# Patient Record
Sex: FEMALE
[De-identification: names, ages, dates, MRNs, and addresses within clinical notes are randomized per-mention and may not be internally consistent; named-entity substitution may affect disease eponyms.]

---

## 2019-06-21 PROBLEM — Z00.00 ENCOUNTER FOR PREVENTIVE HEALTH EXAMINATION: Status: ACTIVE | Noted: 2019-06-21

## 2019-07-01 ENCOUNTER — APPOINTMENT (OUTPATIENT)
Dept: VASCULAR SURGERY | Facility: CLINIC | Age: 49
End: 2019-07-01

## 2020-02-09 ENCOUNTER — TRANSCRIPTION ENCOUNTER (OUTPATIENT)
Age: 50
End: 2020-02-09

## 2021-10-07 ENCOUNTER — OUTPATIENT (OUTPATIENT)
Dept: OUTPATIENT SERVICES | Facility: HOSPITAL | Age: 51
LOS: 1 days | End: 2021-10-07
Payer: COMMERCIAL

## 2021-10-07 ENCOUNTER — APPOINTMENT (OUTPATIENT)
Dept: MAMMOGRAPHY | Facility: CLINIC | Age: 51
End: 2021-10-07
Payer: COMMERCIAL

## 2021-10-07 ENCOUNTER — APPOINTMENT (OUTPATIENT)
Dept: ULTRASOUND IMAGING | Facility: CLINIC | Age: 51
End: 2021-10-07
Payer: COMMERCIAL

## 2021-10-07 DIAGNOSIS — Z00.8 ENCOUNTER FOR OTHER GENERAL EXAMINATION: ICD-10-CM

## 2021-10-07 PROCEDURE — 77063 BREAST TOMOSYNTHESIS BI: CPT

## 2021-10-07 PROCEDURE — 77067 SCR MAMMO BI INCL CAD: CPT | Mod: 26

## 2021-10-07 PROCEDURE — 77063 BREAST TOMOSYNTHESIS BI: CPT | Mod: 26

## 2021-10-07 PROCEDURE — 77067 SCR MAMMO BI INCL CAD: CPT

## 2021-12-20 ENCOUNTER — ASOB RESULT (OUTPATIENT)
Age: 51
End: 2021-12-20

## 2021-12-20 ENCOUNTER — APPOINTMENT (OUTPATIENT)
Dept: ANTEPARTUM | Facility: CLINIC | Age: 51
End: 2021-12-20
Payer: COMMERCIAL

## 2021-12-20 PROCEDURE — 76813 OB US NUCHAL MEAS 1 GEST: CPT | Mod: 59

## 2021-12-20 PROCEDURE — 76801 OB US < 14 WKS SINGLE FETUS: CPT

## 2022-02-14 ENCOUNTER — ASOB RESULT (OUTPATIENT)
Age: 52
End: 2022-02-14

## 2022-02-14 ENCOUNTER — APPOINTMENT (OUTPATIENT)
Dept: ANTEPARTUM | Facility: CLINIC | Age: 52
End: 2022-02-14
Payer: COMMERCIAL

## 2022-02-14 PROCEDURE — 76811 OB US DETAILED SNGL FETUS: CPT

## 2022-02-14 PROCEDURE — 76827 ECHO EXAM OF FETAL HEART: CPT

## 2022-02-14 PROCEDURE — 76825 ECHO EXAM OF FETAL HEART: CPT

## 2022-02-14 PROCEDURE — 93325 DOPPLER ECHO COLOR FLOW MAPG: CPT

## 2022-02-14 PROCEDURE — 76817 TRANSVAGINAL US OBSTETRIC: CPT

## 2022-02-15 ENCOUNTER — OUTPATIENT (OUTPATIENT)
Dept: OUTPATIENT SERVICES | Age: 52
LOS: 1 days | Discharge: HOME | End: 2022-02-15

## 2022-02-16 ENCOUNTER — APPOINTMENT (OUTPATIENT)
Dept: PEDIATRIC CARDIOLOGY | Facility: CLINIC | Age: 52
End: 2022-02-16

## 2022-02-18 ENCOUNTER — APPOINTMENT (OUTPATIENT)
Dept: ANTEPARTUM | Facility: CLINIC | Age: 52
End: 2022-02-18
Payer: COMMERCIAL

## 2022-02-18 ENCOUNTER — ASOB RESULT (OUTPATIENT)
Age: 52
End: 2022-02-18

## 2022-02-18 PROCEDURE — 76815 OB US LIMITED FETUS(S): CPT

## 2022-02-18 PROCEDURE — 76817 TRANSVAGINAL US OBSTETRIC: CPT

## 2022-03-03 ENCOUNTER — APPOINTMENT (OUTPATIENT)
Dept: ANTEPARTUM | Facility: CLINIC | Age: 52
End: 2022-03-03

## 2022-03-09 ENCOUNTER — APPOINTMENT (OUTPATIENT)
Dept: ANTEPARTUM | Facility: CLINIC | Age: 52
End: 2022-03-09
Payer: COMMERCIAL

## 2022-03-09 ENCOUNTER — ASOB RESULT (OUTPATIENT)
Age: 52
End: 2022-03-09

## 2022-03-09 PROCEDURE — 76816 OB US FOLLOW-UP PER FETUS: CPT

## 2022-03-09 PROCEDURE — 76817 TRANSVAGINAL US OBSTETRIC: CPT

## 2022-03-14 ENCOUNTER — APPOINTMENT (OUTPATIENT)
Dept: ANTEPARTUM | Facility: CLINIC | Age: 52
End: 2022-03-14

## 2022-04-11 ENCOUNTER — APPOINTMENT (OUTPATIENT)
Dept: ANTEPARTUM | Facility: CLINIC | Age: 52
End: 2022-04-11
Payer: COMMERCIAL

## 2022-04-11 ENCOUNTER — ASOB RESULT (OUTPATIENT)
Age: 52
End: 2022-04-11

## 2022-04-11 PROCEDURE — 76816 OB US FOLLOW-UP PER FETUS: CPT

## 2022-05-09 ENCOUNTER — APPOINTMENT (OUTPATIENT)
Dept: ANTEPARTUM | Facility: CLINIC | Age: 52
End: 2022-05-09
Payer: COMMERCIAL

## 2022-05-09 ENCOUNTER — ASOB RESULT (OUTPATIENT)
Age: 52
End: 2022-05-09

## 2022-05-09 PROCEDURE — 76819 FETAL BIOPHYS PROFIL W/O NST: CPT

## 2022-05-09 PROCEDURE — 76816 OB US FOLLOW-UP PER FETUS: CPT

## 2022-05-21 ENCOUNTER — NON-APPOINTMENT (OUTPATIENT)
Age: 52
End: 2022-05-21

## 2022-05-23 ENCOUNTER — TRANSCRIPTION ENCOUNTER (OUTPATIENT)
Age: 52
End: 2022-05-23

## 2022-06-03 ENCOUNTER — APPOINTMENT (OUTPATIENT)
Dept: ANTEPARTUM | Facility: CLINIC | Age: 52
End: 2022-06-03
Payer: COMMERCIAL

## 2022-06-03 ENCOUNTER — ASOB RESULT (OUTPATIENT)
Age: 52
End: 2022-06-03

## 2022-06-03 PROCEDURE — 76816 OB US FOLLOW-UP PER FETUS: CPT

## 2022-06-03 PROCEDURE — 76819 FETAL BIOPHYS PROFIL W/O NST: CPT

## 2022-06-10 ENCOUNTER — APPOINTMENT (OUTPATIENT)
Dept: ANTEPARTUM | Facility: CLINIC | Age: 52
End: 2022-06-10
Payer: COMMERCIAL

## 2022-06-10 ENCOUNTER — ASOB RESULT (OUTPATIENT)
Age: 52
End: 2022-06-10

## 2022-06-10 PROCEDURE — 76819 FETAL BIOPHYS PROFIL W/O NST: CPT

## 2022-06-17 ENCOUNTER — ASOB RESULT (OUTPATIENT)
Age: 52
End: 2022-06-17

## 2022-06-17 ENCOUNTER — APPOINTMENT (OUTPATIENT)
Dept: ANTEPARTUM | Facility: CLINIC | Age: 52
End: 2022-06-17
Payer: COMMERCIAL

## 2022-06-17 PROCEDURE — 76819 FETAL BIOPHYS PROFIL W/O NST: CPT

## 2022-06-24 ENCOUNTER — ASOB RESULT (OUTPATIENT)
Age: 52
End: 2022-06-24

## 2022-06-24 ENCOUNTER — APPOINTMENT (OUTPATIENT)
Dept: ANTEPARTUM | Facility: CLINIC | Age: 52
End: 2022-06-24
Payer: COMMERCIAL

## 2022-06-24 PROCEDURE — 76819 FETAL BIOPHYS PROFIL W/O NST: CPT

## 2022-07-01 ENCOUNTER — APPOINTMENT (OUTPATIENT)
Dept: ANTEPARTUM | Facility: CLINIC | Age: 52
End: 2022-07-01

## 2022-07-01 ENCOUNTER — ASOB RESULT (OUTPATIENT)
Age: 52
End: 2022-07-01

## 2022-07-01 PROCEDURE — 76816 OB US FOLLOW-UP PER FETUS: CPT

## 2022-07-01 PROCEDURE — 76819 FETAL BIOPHYS PROFIL W/O NST: CPT

## 2022-07-03 ENCOUNTER — TRANSCRIPTION ENCOUNTER (OUTPATIENT)
Age: 52
End: 2022-07-03

## 2022-07-03 ENCOUNTER — INPATIENT (INPATIENT)
Facility: HOSPITAL | Age: 52
LOS: 0 days | Discharge: HOME | End: 2022-07-04
Attending: STUDENT IN AN ORGANIZED HEALTH CARE EDUCATION/TRAINING PROGRAM | Admitting: OBSTETRICS & GYNECOLOGY
Payer: COMMERCIAL

## 2022-07-03 VITALS
RESPIRATION RATE: 18 BRPM | SYSTOLIC BLOOD PRESSURE: 132 MMHG | HEART RATE: 80 BPM | TEMPERATURE: 98 F | DIASTOLIC BLOOD PRESSURE: 77 MMHG

## 2022-07-03 DIAGNOSIS — O26.899 OTHER SPECIFIED PREGNANCY RELATED CONDITIONS, UNSPECIFIED TRIMESTER: ICD-10-CM

## 2022-07-03 DIAGNOSIS — Z34.80 ENCOUNTER FOR SUPERVISION OF OTHER NORMAL PREGNANCY, UNSPECIFIED TRIMESTER: ICD-10-CM

## 2022-07-03 DIAGNOSIS — Z90.89 ACQUIRED ABSENCE OF OTHER ORGANS: Chronic | ICD-10-CM

## 2022-07-03 DIAGNOSIS — Z3A.00 WEEKS OF GESTATION OF PREGNANCY NOT SPECIFIED: ICD-10-CM

## 2022-07-03 LAB
BLD GP AB SCN SERPL QL: NEGATIVE — SIGNIFICANT CHANGE UP
COVID-19 SPIKE DOMAIN AB INTERP: NEGATIVE — SIGNIFICANT CHANGE UP
COVID-19 SPIKE DOMAIN ANTIBODY RESULT: 0.4 U/ML — SIGNIFICANT CHANGE UP
HCT VFR BLD CALC: 40.1 % — SIGNIFICANT CHANGE UP (ref 34.5–45)
HGB BLD-MCNC: 12.3 G/DL — SIGNIFICANT CHANGE UP (ref 11.5–15.5)
MCHC RBC-ENTMCNC: 24.4 PG — LOW (ref 27–34)
MCHC RBC-ENTMCNC: 30.7 GM/DL — LOW (ref 32–36)
MCV RBC AUTO: 79.6 FL — LOW (ref 80–100)
NRBC # BLD: 0 /100 WBCS — SIGNIFICANT CHANGE UP (ref 0–0)
PLATELET # BLD AUTO: 253 K/UL — SIGNIFICANT CHANGE UP (ref 150–400)
RBC # BLD: 5.04 M/UL — SIGNIFICANT CHANGE UP (ref 3.8–5.2)
RBC # FLD: 27.9 % — HIGH (ref 10.3–14.5)
RH IG SCN BLD-IMP: POSITIVE — SIGNIFICANT CHANGE UP
RH IG SCN BLD-IMP: POSITIVE — SIGNIFICANT CHANGE UP
SARS-COV-2 IGG+IGM SERPL QL IA: 0.4 U/ML — SIGNIFICANT CHANGE UP
SARS-COV-2 IGG+IGM SERPL QL IA: NEGATIVE — SIGNIFICANT CHANGE UP
SARS-COV-2 RNA SPEC QL NAA+PROBE: SIGNIFICANT CHANGE UP
WBC # BLD: 13.97 K/UL — HIGH (ref 3.8–10.5)
WBC # FLD AUTO: 13.97 K/UL — HIGH (ref 3.8–10.5)

## 2022-07-03 RX ORDER — HYDROCORTISONE 1 %
1 OINTMENT (GRAM) TOPICAL EVERY 6 HOURS
Refills: 0 | Status: DISCONTINUED | OUTPATIENT
Start: 2022-07-03 | End: 2022-07-04

## 2022-07-03 RX ORDER — DIPHENHYDRAMINE HCL 50 MG
25 CAPSULE ORAL EVERY 6 HOURS
Refills: 0 | Status: DISCONTINUED | OUTPATIENT
Start: 2022-07-03 | End: 2022-07-04

## 2022-07-03 RX ORDER — IBUPROFEN 200 MG
600 TABLET ORAL EVERY 6 HOURS
Refills: 0 | Status: COMPLETED | OUTPATIENT
Start: 2022-07-03 | End: 2023-06-01

## 2022-07-03 RX ORDER — OXYCODONE HYDROCHLORIDE 5 MG/1
5 TABLET ORAL ONCE
Refills: 0 | Status: DISCONTINUED | OUTPATIENT
Start: 2022-07-03 | End: 2022-07-04

## 2022-07-03 RX ORDER — SIMETHICONE 80 MG/1
80 TABLET, CHEWABLE ORAL EVERY 4 HOURS
Refills: 0 | Status: DISCONTINUED | OUTPATIENT
Start: 2022-07-03 | End: 2022-07-04

## 2022-07-03 RX ORDER — DIBUCAINE 1 %
1 OINTMENT (GRAM) RECTAL EVERY 6 HOURS
Refills: 0 | Status: DISCONTINUED | OUTPATIENT
Start: 2022-07-03 | End: 2022-07-04

## 2022-07-03 RX ORDER — OXYCODONE HYDROCHLORIDE 5 MG/1
5 TABLET ORAL
Refills: 0 | Status: DISCONTINUED | OUTPATIENT
Start: 2022-07-03 | End: 2022-07-04

## 2022-07-03 RX ORDER — KETOROLAC TROMETHAMINE 30 MG/ML
30 SYRINGE (ML) INJECTION ONCE
Refills: 0 | Status: DISCONTINUED | OUTPATIENT
Start: 2022-07-03 | End: 2022-07-03

## 2022-07-03 RX ORDER — ACETAMINOPHEN 500 MG
975 TABLET ORAL
Refills: 0 | Status: DISCONTINUED | OUTPATIENT
Start: 2022-07-03 | End: 2022-07-04

## 2022-07-03 RX ORDER — IBUPROFEN 200 MG
600 TABLET ORAL EVERY 6 HOURS
Refills: 0 | Status: DISCONTINUED | OUTPATIENT
Start: 2022-07-03 | End: 2022-07-04

## 2022-07-03 RX ORDER — MAGNESIUM HYDROXIDE 400 MG/1
30 TABLET, CHEWABLE ORAL
Refills: 0 | Status: DISCONTINUED | OUTPATIENT
Start: 2022-07-03 | End: 2022-07-04

## 2022-07-03 RX ORDER — ACETAMINOPHEN 500 MG
3 TABLET ORAL
Qty: 0 | Refills: 0 | DISCHARGE
Start: 2022-07-03

## 2022-07-03 RX ORDER — TETANUS TOXOID, REDUCED DIPHTHERIA TOXOID AND ACELLULAR PERTUSSIS VACCINE, ADSORBED 5; 2.5; 8; 8; 2.5 [IU]/.5ML; [IU]/.5ML; UG/.5ML; UG/.5ML; UG/.5ML
0.5 SUSPENSION INTRAMUSCULAR ONCE
Refills: 0 | Status: DISCONTINUED | OUTPATIENT
Start: 2022-07-03 | End: 2022-07-04

## 2022-07-03 RX ORDER — BENZOCAINE 10 %
1 GEL (GRAM) MUCOUS MEMBRANE EVERY 6 HOURS
Refills: 0 | Status: DISCONTINUED | OUTPATIENT
Start: 2022-07-03 | End: 2022-07-04

## 2022-07-03 RX ORDER — PRAMOXINE HYDROCHLORIDE 150 MG/15G
1 AEROSOL, FOAM RECTAL EVERY 4 HOURS
Refills: 0 | Status: DISCONTINUED | OUTPATIENT
Start: 2022-07-03 | End: 2022-07-04

## 2022-07-03 RX ORDER — AER TRAVELER 0.5 G/1
1 SOLUTION RECTAL; TOPICAL EVERY 4 HOURS
Refills: 0 | Status: DISCONTINUED | OUTPATIENT
Start: 2022-07-03 | End: 2022-07-04

## 2022-07-03 RX ORDER — OXYTOCIN 10 UNIT/ML
333.33 VIAL (ML) INJECTION
Qty: 20 | Refills: 0 | Status: DISCONTINUED | OUTPATIENT
Start: 2022-07-03 | End: 2022-07-04

## 2022-07-03 RX ORDER — SODIUM CHLORIDE 9 MG/ML
3 INJECTION INTRAMUSCULAR; INTRAVENOUS; SUBCUTANEOUS EVERY 8 HOURS
Refills: 0 | Status: DISCONTINUED | OUTPATIENT
Start: 2022-07-03 | End: 2022-07-04

## 2022-07-03 RX ORDER — IBUPROFEN 200 MG
1 TABLET ORAL
Qty: 0 | Refills: 0 | DISCHARGE
Start: 2022-07-03

## 2022-07-03 RX ORDER — LANOLIN
1 OINTMENT (GRAM) TOPICAL EVERY 6 HOURS
Refills: 0 | Status: DISCONTINUED | OUTPATIENT
Start: 2022-07-03 | End: 2022-07-04

## 2022-07-03 RX ADMIN — Medication 975 MILLIGRAM(S): at 20:48

## 2022-07-03 RX ADMIN — Medication 975 MILLIGRAM(S): at 08:50

## 2022-07-03 RX ADMIN — Medication 600 MILLIGRAM(S): at 17:51

## 2022-07-03 RX ADMIN — Medication 975 MILLIGRAM(S): at 15:39

## 2022-07-03 RX ADMIN — Medication 600 MILLIGRAM(S): at 12:36

## 2022-07-03 RX ADMIN — Medication 975 MILLIGRAM(S): at 15:14

## 2022-07-03 RX ADMIN — Medication 600 MILLIGRAM(S): at 23:57

## 2022-07-03 RX ADMIN — Medication 975 MILLIGRAM(S): at 21:18

## 2022-07-03 RX ADMIN — Medication 600 MILLIGRAM(S): at 17:28

## 2022-07-03 RX ADMIN — Medication 30 MILLIGRAM(S): at 03:45

## 2022-07-03 RX ADMIN — Medication 600 MILLIGRAM(S): at 12:06

## 2022-07-03 RX ADMIN — Medication 975 MILLIGRAM(S): at 08:22

## 2022-07-03 NOTE — OB PROVIDER H&P - ATTENDING COMMENTS
patient admitted in active labor and PROM shortly before arriving to hospital    STEPHANIE Schmid MD

## 2022-07-03 NOTE — OB RN PATIENT PROFILE - FALL HARM RISK - UNIVERSAL INTERVENTIONS
Bed in lowest position, wheels locked, appropriate side rails in place/Call bell, personal items and telephone in reach/Instruct patient to call for assistance before getting out of bed or chair/Non-slip footwear when patient is out of bed/[place] to call system/Physically safe environment - no spills, clutter or unnecessary equipment/Purposeful Proactive Rounding/Room/bathroom lighting operational, light cord in reach

## 2022-07-03 NOTE — OB PROVIDER DELIVERY SUMMARY - NSATTENDATTESTATION_OBGYN_A_OB
Through wd window  Avoid sedative medications   I was physically present and participated in this delivery.

## 2022-07-03 NOTE — DISCHARGE NOTE OB - MEDICATION SUMMARY - MEDICATIONS TO TAKE
I will START or STAY ON the medications listed below when I get home from the hospital:    acetaminophen 325 mg oral tablet  -- 3 tab(s) by mouth every 6 hours  -- Indication: For pain    ibuprofen 600 mg oral tablet  -- 1 tab(s) by mouth every 6 hours  -- Indication: For pain    Prenatal Multivitamins with Folic Acid 1 mg oral tablet  -- 1 tab(s) by mouth once a day  -- Indication: For vitamin

## 2022-07-03 NOTE — DISCHARGE NOTE OB - PLAN OF CARE
After discharge, please stay on pelvic rest for 6 weeks, meaning no sexual intercourse, no tampons and no douching.  No driving for 2 weeks as women can lose a lot of blood during delivery and there is a possibility of being lightheaded/fainting.  No lifting objects heavier than baby for two weeks.  Expect to have vaginal bleeding/spotting for up to six weeks.  The bleeding should get lighter and more white/light brown with time.  For bleeding soaking more than a pad an hour or passing clots greater than the size of your fist, come in to the emergency department.    Follow up in the office in 5 weeks for a postpartum visit.

## 2022-07-03 NOTE — OB PROVIDER H&P - HISTORY OF PRESENT ILLNESS
HPI: Pt is a 51y   @40wks+2d presenting in labor and precipitously delivered.     IVF donor transfer    PNC uncomplicated.    OBHx: 2020  uncomplicated  GynHx: + hx of fibroids denies hx of cysts, abnormal Pap smears or STDs  PMHx: denies  PSHx: denies  Med: PNV, ASA  All: NKDA  Psych: denies hx of of depression or anxiety  SH: denies hx of smoking, drinking, or drug usage during the pregnancy

## 2022-07-03 NOTE — OB PROVIDER DELIVERY SUMMARY - NSPROVIDERDELIVERYNOTE_OBGYN_ALL_OB_FT
female infant delivered over intact perineum. no nuchal cord. Shoulders and body delivered spontaneously. Delayed cord clamping and private cord blood collection performed. placenta delivered spontaneously, intact. second degree and L labial lacerations repaired with 2-0 and 3-0 vicryl. vagina and sulci inspected and a small R sulcal tear was noted and reapproximated. no other lacerations noted. fundus firm and bleeding minimal after delivery

## 2022-07-03 NOTE — DISCHARGE NOTE OB - CARE PLAN
Principal Discharge DX:	Vaginal delivery  Assessment and plan of treatment:	After discharge, please stay on pelvic rest for 6 weeks, meaning no sexual intercourse, no tampons and no douching.  No driving for 2 weeks as women can lose a lot of blood during delivery and there is a possibility of being lightheaded/fainting.  No lifting objects heavier than baby for two weeks.  Expect to have vaginal bleeding/spotting for up to six weeks.  The bleeding should get lighter and more white/light brown with time.  For bleeding soaking more than a pad an hour or passing clots greater than the size of your fist, come in to the emergency department.    Follow up in the office in 5 weeks for a postpartum visit.   1

## 2022-07-03 NOTE — DISCHARGE NOTE OB - CARE PROVIDER_API CALL
Jerri Schmid)  OBSGYN  General  [address] [place] [address], [address] [place] [address]  Phone: [phone]  Fax: [phone]  Follow Up Time:

## 2022-07-03 NOTE — OB PROVIDER H&P - NSHPPHYSICALEXAM_GEN_ALL_CORE
Vital Signs Last 24 Hrs  T(C): 36.5 (03 Jul 2022 01:35), Max: 36.5 (03 Jul 2022 01:35)  T(F): 97.7 (03 Jul 2022 01:35), Max: 97.7 (03 Jul 2022 01:35)  HR: 93 (03 Jul 2022 04:24) (71 - 100)  BP: 126/71 (03 Jul 2022 04:24) (107/67 - 139/84)  BP(mean): --  RR: 18 (03 Jul 2022 01:35) (18 - 18)  SpO2: 99% (03 Jul 2022 04:24) (98% - 100%)    Physical Exam:  Gen: NAD, AOx3  CV: RRR  Resp: CTAB  Abd: soft, NT, gravid

## 2022-07-03 NOTE — DISCHARGE NOTE OB - [facility] PROVIDES COLOSTRUM THAT IS HIGH IN PROTEIN
Order placed and printed and placed at  at [facility]. Patient made aware.   
Pt is wondering if she can get a written prescription for her mammogram, pt gets these done in [place]. [place] now and is wanting to pick It up at [facility].   
Statement Selected

## 2022-07-03 NOTE — OB PROVIDER H&P - ASSESSMENT
A/P:  Pt is a 51y   @40+2 presenting in labor and precipitously delivered.     - Admit to LND.   -Routine Labs.   -IVF  -covid swab  -postpartum orders    [place] PGY3  d/w Dr. Schmid

## 2022-07-03 NOTE — DISCHARGE NOTE OB - HOSPITAL COURSE
Patient had uncomplicated, nonsurgical vaginal delivery.  Please see delivery note for details.  During postpartum course patient's vitals were stable, vaginal bleeding appropriate, and pain well controlled.  On day of discharge patient was ambulating, her pain controlled with oral medications, had adequate oral intake, and was voiding freely.  Discharge instructions and precautions were given.  Will return to clinic in 5 weeks for postpartum visit.

## 2022-07-03 NOTE — OB RN PATIENT PROFILE - INSTRUCTED TO PATIENT: IF THE INFANT IS NOT PINK DURING SKIN TO SKIN, THE PARENTS IS TO SEEK ASSISTANCE IMMEDIATELY.
Pt to ED from  for evaluation of CP, SOB, N/V x3 days. Pt had abnormal EKG at  and was directed to present to ED for further evaluation. Pt denies any other complaints. A&OX4.    Statement Selected

## 2022-07-03 NOTE — OB RN PATIENT PROFILE - FUNCTIONAL ASSESSMENT - BASIC MOBILITY 6.
4-calculated by average/Not able to assess (calculate score using [facility] averaging method)

## 2022-07-03 NOTE — DISCHARGE NOTE OB - PATIENT PORTAL LINK FT
You can access the FollowMyHealth Patient Portal offered by [facility] by registering at the following website: [email or web address]. By joining "[employer]’s FollowMyHealth portal, you will also be able to view your health information using other applications (apps) compatible with our system.

## 2022-07-03 NOTE — DISCHARGE NOTE OB - NS MD DC FALL RISK RISK
For information on Fall & Injury Prevention, visit: [email or web address] OR  [email or web address] OR  https://www.cdc.gov/steadi/patient.html

## 2022-07-04 VITALS
OXYGEN SATURATION: 98 % | TEMPERATURE: 98 F | SYSTOLIC BLOOD PRESSURE: 117 MMHG | RESPIRATION RATE: 18 BRPM | DIASTOLIC BLOOD PRESSURE: 73 MMHG | HEART RATE: 61 BPM

## 2022-07-04 LAB
HCT VFR BLD CALC: 24.8 % — LOW (ref 34.5–45)
HGB BLD-MCNC: 7.6 G/DL — LOW (ref 11.5–15.5)
T PALLIDUM AB TITR SER: NEGATIVE — SIGNIFICANT CHANGE UP

## 2022-07-04 PROCEDURE — 86900 BLOOD TYPING SEROLOGIC ABO: CPT

## 2022-07-04 PROCEDURE — 36415 COLL VENOUS BLD VENIPUNCTURE: CPT

## 2022-07-04 PROCEDURE — G0463: CPT

## 2022-07-04 PROCEDURE — 86780 TREPONEMA PALLIDUM: CPT

## 2022-07-04 PROCEDURE — 59050 FETAL MONITOR W/REPORT: CPT

## 2022-07-04 PROCEDURE — 85014 HEMATOCRIT: CPT

## 2022-07-04 PROCEDURE — 86850 RBC ANTIBODY SCREEN: CPT

## 2022-07-04 PROCEDURE — 87635 SARS-COV-2 COVID-19 AMP PRB: CPT

## 2022-07-04 PROCEDURE — 85018 HEMOGLOBIN: CPT

## 2022-07-04 PROCEDURE — 86769 SARS-COV-2 COVID-19 ANTIBODY: CPT

## 2022-07-04 PROCEDURE — 86901 BLOOD TYPING SEROLOGIC RH(D): CPT

## 2022-07-04 PROCEDURE — 85027 COMPLETE CBC AUTOMATED: CPT

## 2022-07-04 RX ADMIN — Medication 600 MILLIGRAM(S): at 00:27

## 2022-07-04 RX ADMIN — Medication 975 MILLIGRAM(S): at 03:16

## 2022-07-04 RX ADMIN — Medication 975 MILLIGRAM(S): at 09:32

## 2022-07-04 RX ADMIN — Medication 975 MILLIGRAM(S): at 02:46

## 2022-07-04 RX ADMIN — Medication 600 MILLIGRAM(S): at 12:30

## 2022-07-04 RX ADMIN — Medication 975 MILLIGRAM(S): at 10:00

## 2022-07-04 RX ADMIN — Medication 600 MILLIGRAM(S): at 11:54

## 2022-07-04 RX ADMIN — Medication 600 MILLIGRAM(S): at 05:28

## 2022-07-04 NOTE — PROGRESS NOTE ADULT - ATTENDING COMMENTS
I personally saw and evaluated pt.  She is doing well on PPD 1 after vaginal delivery.  Pt denies any dizziness or lightheadedness and is able to ambulate without difficulty.  Her pain is adequately controlled and she remained afebrile  On exam, fundus is firm below umbilicus and bleeding is moderate.  H/H this am showed PP anemia.  Advised pt to continue her daily S[place] Fe as well as incorporate Fe rich foods including leafy green vegetables.  Discharge planning today    KIRBY Michael

## 2022-07-04 NOTE — PROGRESS NOTE ADULT - SUBJECTIVE AND OBJECTIVE BOX
OB Progress Note:  PPD#2    S: 52yo PPD#2 s/p . Patient feels well. Complains of some constipation. Denies any use of a stool softner. Has not passed flatus. Pain is well controlled, tolerating regular diet, voiding spontaneously, ambulating without difficulty. Denies heavy vaginal bleeding, CP/SOB, leadheadedness/dizziness, N/V.    O:  Vitals:   Vital Signs Last 24 Hrs  T(C): 36.6 (2022 05:24), Max: 37 (2022 08:00)  T(F): 97.8 (2022 05:24), Max: 98.6 (2022 08:00)  HR: 61 (2022 05:24) (60 - 76)  BP: 117/73 (2022 05:24) (107/64 - 130/80)  BP(mean): --  RR: 18 (2022 05:24) (18 - 18)  SpO2: 98% (2022 05:24) (97% - 98%)    MEDICATIONS  (STANDING):  acetaminophen     Tablet .. 975 milliGRAM(s) Oral <User Schedule>  diphtheria/tetanus/pertussis (acellular) Vaccine (ADAcel) 0.5 milliLiter(s) IntraMuscular once  ibuprofen  Tablet. 600 milliGRAM(s) Oral every 6 hours  oxytocin Infusion 333.333 milliUNIT(s)/Min (1000 mL/Hr) IV Continuous <Continuous>  prenatal multivitamin 1 Tablet(s) Oral daily  sodium chloride 0.9% lock flush 3 milliLiter(s) IV Push every 8 hours    MEDICATIONS  (PRN):  benzocaine 20%/menthol 0.5% Spray 1 Spray(s) Topical every 6 hours PRN for Perineal discomfort  dibucaine 1% Ointment 1 Application(s) Topical every 6 hours PRN Perineal discomfort  diphenhydrAMINE 25 milliGRAM(s) Oral every 6 hours PRN Pruritus  hydrocortisone 1% Cream 1 Application(s) Topical every 6 hours PRN Moderate Pain (4-6)  lanolin Ointment 1 Application(s) Topical every 6 hours PRN nipple soreness  magnesium hydroxide Suspension 30 milliLiter(s) Oral two times a day PRN Constipation  oxyCODONE    IR 5 milliGRAM(s) Oral every 3 hours PRN Moderate to Severe Pain (4-10)  oxyCODONE    IR 5 milliGRAM(s) Oral once PRN Moderate to Severe Pain (4-10)  pramoxine 1%/zinc 5% Cream 1 Application(s) Topical every 4 hours PRN Moderate Pain (4-6)  simethicone 80 milliGRAM(s) Chew every 4 hours PRN Gas  witch hazel Pads 1 Application(s) Topical every 4 hours PRN Perineal discomfort      Labs:  Blood type: A Positive  Rubella IgG: RPR: Negative                          7.6<L>   -- >-----------< --    (  @ 06:12 )             24.8<L>                        12.3   13.97<H> >-----------< 253    (  @ 05:03 )             40.1        Physical Exam:  General: NAD  Abdomen: Firm, R-deviated uterine fundis. Soft, non-tender, non-distended  Vaginal: No heavy vaginal bleeding  Extremities: No erythema/edema    A/P: 52yo PPD#2 s/p .  Patient is stable and doing well post-partum.      - Pain well controlled, continue current pain regimen  - Increase ambulation, SCDs when not ambulating  - Continue regular diet   - Start stool softener  - Discharge planning: home today     Claudette Jovel, PGY-1
[FreeTextEntry1] : Ms. Tim is a 41 year-old woman with a history of celiac disease and Hashimoto's hypothyroidism presenting for f/u of hypothyroidism. She's is scheduled for colonoscopy on Friday for bowel leakage and anal pruritus. She reports difficulty following asleep and difficulty waking up in the morning and feeling very sluggish for several hours upon waking. She has tried melatonin, which helps her fall asleep but not stay asleep. She reports some weight gain. She reports some decreased exercise tolerance over past year. She has been furloughed from her job, so routine is altered. She was in [place] for a couple months where she had her last labs drawn. She has been back in  for a couple of months, splitting time between [facility] and [place].\par \par Hypothyroidism.\par She was diagnosed in 2000.\par She is taking [place] thyroid 67.5 mg per day and has been for past year.\par She takes it in the morning, on an empty stomach, with plain water, and waits 1 hour before eating.\par Labs -- May 2019: TSH 0.70, FT4 0.7, TT4 5.1, TT3 29, FT3 3.3. Reported labs from May 2020: TSH 0.5mI u/l (0.27-4.2), and thyroxine 9.1 pmol (12.0-22).\par Thyroid US - January 2017. No nodules and unremarkable. See scanned.\par Previously she was taking WP Thyroid for about 3 years, at the 81.25 mg dose since about September 2017. WP Thyroid had been very difficult to obtain from pharmacies. Prior to that, she was previously on brand-name Synthroid and had subsequently felt much better since the switch to a natural formulation.\par No history of head/neck radiation.\par She had a thyroid US approx two and a half ago. Thinks she remembers thyroid being small. Doesn't recall nodules. No reports available.\par Her paternal aunt has a history of hypothyroidism.\par \par She takes the following supplements: homocysteine supreme, digestive enzymes, fish oil, probiotics, Vitamin C and D.\par She recently went to GI for f/u on celiac and pruritic ani who recommended probiotic yogurt and daily Imodium.

## 2022-07-11 ENCOUNTER — NON-APPOINTMENT (OUTPATIENT)
Age: 52
End: 2022-07-11

## 2022-07-21 ENCOUNTER — NON-APPOINTMENT (OUTPATIENT)
Age: 52
End: 2022-07-21

## 2023-02-08 ENCOUNTER — NON-APPOINTMENT (OUTPATIENT)
Age: 53
End: 2023-02-08

## 2023-10-29 ENCOUNTER — NON-APPOINTMENT (OUTPATIENT)
Age: 53
End: 2023-10-29

## 2023-11-02 ENCOUNTER — NON-APPOINTMENT (OUTPATIENT)
Age: 53
End: 2023-11-02

## 2023-11-03 ENCOUNTER — NON-APPOINTMENT (OUTPATIENT)
Age: 53
End: 2023-11-03

## 2023-11-18 ENCOUNTER — NON-APPOINTMENT (OUTPATIENT)
Age: 53
End: 2023-11-18

## 2023-12-10 ENCOUNTER — NON-APPOINTMENT (OUTPATIENT)
Age: 53
End: 2023-12-10

## 2024-02-14 ENCOUNTER — NON-APPOINTMENT (OUTPATIENT)
Age: 54
End: 2024-02-14

## 2024-02-18 ENCOUNTER — NON-APPOINTMENT (OUTPATIENT)
Age: 54
End: 2024-02-18

## 2024-05-16 PROBLEM — N84.0 POLYP OF CORPUS UTERI: Chronic | Status: ACTIVE | Noted: 2022-07-03

## 2024-05-30 ENCOUNTER — APPOINTMENT (OUTPATIENT)
Dept: MAMMOGRAPHY | Facility: CLINIC | Age: 54
End: 2024-05-30
Payer: COMMERCIAL

## 2024-05-30 ENCOUNTER — APPOINTMENT (OUTPATIENT)
Dept: RADIOLOGY | Facility: CLINIC | Age: 54
End: 2024-05-30
Payer: COMMERCIAL

## 2024-05-30 ENCOUNTER — OUTPATIENT (OUTPATIENT)
Dept: OUTPATIENT SERVICES | Facility: HOSPITAL | Age: 54
LOS: 1 days | End: 2024-05-30
Payer: COMMERCIAL

## 2024-05-30 ENCOUNTER — APPOINTMENT (OUTPATIENT)
Dept: ULTRASOUND IMAGING | Facility: CLINIC | Age: 54
End: 2024-05-30
Payer: COMMERCIAL

## 2024-05-30 DIAGNOSIS — Z90.89 ACQUIRED ABSENCE OF OTHER ORGANS: Chronic | ICD-10-CM

## 2024-05-30 DIAGNOSIS — Z00.8 ENCOUNTER FOR OTHER GENERAL EXAMINATION: ICD-10-CM

## 2024-05-30 PROCEDURE — 77063 BREAST TOMOSYNTHESIS BI: CPT | Mod: 26

## 2024-05-30 PROCEDURE — 76856 US EXAM PELVIC COMPLETE: CPT | Mod: 26

## 2024-05-30 PROCEDURE — 76830 TRANSVAGINAL US NON-OB: CPT | Mod: 26

## 2024-05-30 PROCEDURE — 77080 DXA BONE DENSITY AXIAL: CPT | Mod: 26

## 2024-05-30 PROCEDURE — 77067 SCR MAMMO BI INCL CAD: CPT | Mod: 26

## 2024-05-30 PROCEDURE — 76830 TRANSVAGINAL US NON-OB: CPT

## 2024-05-30 PROCEDURE — 76641 ULTRASOUND BREAST COMPLETE: CPT | Mod: 26,50

## 2024-05-30 PROCEDURE — 77067 SCR MAMMO BI INCL CAD: CPT

## 2024-05-30 PROCEDURE — 77080 DXA BONE DENSITY AXIAL: CPT

## 2024-05-30 PROCEDURE — 76641 ULTRASOUND BREAST COMPLETE: CPT

## 2024-05-30 PROCEDURE — 76856 US EXAM PELVIC COMPLETE: CPT

## 2024-05-30 PROCEDURE — 77063 BREAST TOMOSYNTHESIS BI: CPT

## 2025-04-01 ENCOUNTER — NON-APPOINTMENT (OUTPATIENT)
Age: 55
End: 2025-04-01

## 2025-04-18 ENCOUNTER — NON-APPOINTMENT (OUTPATIENT)
Age: 55
End: 2025-04-18

## 2025-08-29 ENCOUNTER — NON-APPOINTMENT (OUTPATIENT)
Age: 55
End: 2025-08-29